# Patient Record
Sex: MALE | Race: BLACK OR AFRICAN AMERICAN | Employment: FULL TIME | URBAN - METROPOLITAN AREA
[De-identification: names, ages, dates, MRNs, and addresses within clinical notes are randomized per-mention and may not be internally consistent; named-entity substitution may affect disease eponyms.]

---

## 2017-03-17 ENCOUNTER — TELEPHONE (OUTPATIENT)
Dept: ONCOLOGY | Age: 27
End: 2017-03-17
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
## 2019-04-25 ENCOUNTER — APPOINTMENT (OUTPATIENT)
Dept: VASCULAR SURGERY | Age: 29
End: 2019-04-25
Attending: EMERGENCY MEDICINE
Payer: SELF-PAY

## 2019-04-25 ENCOUNTER — HOSPITAL ENCOUNTER (EMERGENCY)
Age: 29
Discharge: HOME | End: 2019-04-25
Attending: EMERGENCY MEDICINE
Payer: SELF-PAY

## 2019-04-25 VITALS
SYSTOLIC BLOOD PRESSURE: 128 MMHG | DIASTOLIC BLOOD PRESSURE: 83 MMHG | OXYGEN SATURATION: 97 % | WEIGHT: 232 LBS | BODY MASS INDEX: 35.16 KG/M2 | RESPIRATION RATE: 16 BRPM | HEART RATE: 87 BPM | TEMPERATURE: 98.1 F | HEIGHT: 68 IN

## 2019-04-25 DIAGNOSIS — M79.89 LEG SWELLING: Primary | ICD-10-CM

## 2019-04-25 PROCEDURE — 93971 EXTREMITY STUDY: CPT

## 2019-04-25 PROCEDURE — 99282 EMERGENCY DEPT VISIT SF MDM: CPT

## 2019-04-25 NOTE — LETTER
[facility] EMERGENCY DEPT 
[facility] [id number] 
[phone] Work/School Note Date: 4/25/2019 To Whom It May concern: 
 
Tiffanie Swift was seen and treated today in the emergency room by the following provider(s): 
Attending Provider: Tor Dubin, DO. Tiffanie Swift may return to work on 4/27/19. Sincerely, Alfredo Donald DO

## 2019-04-25 NOTE — ED NOTES
.I have reviewed discharge instructions with the patient. The patient verbalized understanding.   Patient armband removed and shredded  Pt VSS, NAD, pain reassessed

## 2019-04-25 NOTE — ED PROVIDER NOTES
EMERGENCY DEPARTMENT HISTORY AND PHYSICAL EXAM    10:21 AM      Date: 4/25/2019  Patient Name: Matt Alberts    History of Presenting Illness     Chief Complaint   Patient presents with    Leg Swelling    Leg Pain         History Provided By: Patient    Chief Complaint: Leg swelling  Duration:  Days  Timing:  Acute  Location: Left lower leg and ankle  Quality: NA  Severity: Moderate  Modifying Factors: none  Associated Symptoms: denies any other associated signs or symptoms      Additional History (Context): Matt Alberts is a 33 y.o. male with No significant past medical history who presents with left lower leg swelling x3 days. Denies any fall or injury. He does report improved somewhat last night after elevating. He states he went to the [facility] yesterday however it was a prolonged wait so he left prior to any evaluation. No prior history of DVT or PE. No recent travel, or recent surgery. Denies any pain. No shortness of breath or chest pain. No other complaints. .    PCP: Nichole Alan MD        Past History     Past Medical History:  Past Medical History:   Diagnosis Date    ADHD (attention deficit hyperactivity disorder)     ADHD (attention deficit hyperactivity disorder)     Headache     Hypertension     Insomnia     Leukemia ([address]     Leukopenia     Leukopenia     Pancreatitis        Past Surgical History:  History reviewed. No pertinent surgical history.     Family History:  Family History   Problem Relation Age of Onset    Diabetes Mother     Stroke Mother     Heart Disease Father     Hypertension Father     Diabetes Maternal Grandmother     Hypertension Maternal Grandmother     Cancer Maternal Grandmother     Hypertension Maternal Grandfather     Hypertension Paternal Grandmother     Stroke Paternal Grandmother     Diabetes Paternal Grandfather     Hypertension Paternal Grandfather     Heart Disease Paternal Grandfather        Social History:  Social History     Tobacco Use    Smoking status: Never Smoker    Smokeless tobacco: Never Used   Substance Use Topics    Alcohol use: Yes     Alcohol/week: 9.0 oz     Types: 15 Shots of liquor per week    Drug use: No       Allergies:  No Known Allergies      Review of Systems     Review of Systems   Respiratory: Negative for shortness of breath. Cardiovascular: Positive for leg swelling. Negative for chest pain. Neurological: Negative for numbness. All other systems reviewed and are negative. Physical Exam     Visit Vitals  /83 (BP 1 Location: Right arm, BP Patient Position: At rest)   Pulse 87   Temp 98.1 °F (36.7 °C)   Resp 16   Ht 5' 8\" (1.727 m)   Wt 105.2 kg (232 lb)   SpO2 97%   BMI 35.28 kg/m²       Physical Exam   Constitutional: He is oriented to person, place, and time. He appears well-developed and well-nourished. HENT:   Head: Normocephalic and atraumatic. Neck: Neck supple. No JVD present. Cardiovascular: Normal rate and regular rhythm. Pulmonary/Chest: Effort normal and breath sounds normal. No respiratory distress. He has no wheezes. Musculoskeletal: He exhibits edema (Left lower extremity 2+). Full range of motion intact left knee and ankle   Neurological: He is alert and oriented to person, place, and time. Gross sensation intact bilateral lower, DP 2+ bilateral lower   Skin: Skin is warm and dry. No erythema. Psychiatric: Judgment normal.         Diagnostic Study Results     Labs -  No results found for this or any previous visit (from the past 12 hour(s)). Radiologic Studies -   No orders to display     Duplex neg per tech    Medical Decision Making   I am the first provider for this patient. I reviewed the vital signs, available nursing notes, past medical history, past surgical history, family history and social history. Vital Signs-Reviewed the patient's vital signs.     Pulse Oximetry Analysis -  97 on room air (Interpretation)nl       Records Reviewed: Nursing Notes (Time of Review: 10:21 AM)    ED Course: Progress Notes, Reevaluation, and Consults:      Provider Notes (Medical Decision Making): 80-year-old male presenting with left lower extremity swelling x3 days. He has denies any injury, will duplex to evaluate for DVT. 11:14 AM  I discussed results with patient, his duplex is negative. He has no signs of CHF, do not feel labs are indicated at this time. Advised to use compression stockings. Of note he does work 2 full-time jobs and is on his feet a lot which may be contributing to the symptoms. Discussed return precautions. Diagnosis     Clinical Impression:   1. Leg swelling        Disposition: discharged    Follow-up Information     Follow up With Specialties Details Why Contact Info    Jeremy Mejia MD Family Practice Schedule an appointment as soon as possible for a visit in 3 days  [address] [address]  [phone]      [facility] EMERGENCY DEPT Emergency Medicine  As needed, If symptoms worsen [address]  [phone]           Patient's Medications   Start Taking    No medications on file   Continue Taking    No medications on file   These Medications have changed    No medications on file   Stop Taking    DICYCLOMINE (BENTYL) 20 MG TABLET    Take 1 Tab by mouth every six (6) hours.  Prn abdominal pain    ONDANSETRON (ZOFRAN ODT) 4 MG DISINTEGRATING TABLET    Take 1 Tab by mouth every eight (8) hours as needed for Nausea.     _______________________________

## 2019-09-04 ENCOUNTER — HOSPITAL ENCOUNTER (EMERGENCY)
Age: 29
Discharge: HOME | End: 2019-09-04
Attending: EMERGENCY MEDICINE
Payer: SELF-PAY

## 2019-09-04 ENCOUNTER — APPOINTMENT (OUTPATIENT)
Dept: GENERAL RADIOLOGY | Age: 29
End: 2019-09-04
Attending: EMERGENCY MEDICINE
Payer: SELF-PAY

## 2019-09-04 VITALS
DIASTOLIC BLOOD PRESSURE: 94 MMHG | TEMPERATURE: 98 F | SYSTOLIC BLOOD PRESSURE: 139 MMHG | BODY MASS INDEX: 37.4 KG/M2 | HEART RATE: 89 BPM | OXYGEN SATURATION: 97 % | WEIGHT: 246 LBS | RESPIRATION RATE: 16 BRPM
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
## 2019-09-04 DIAGNOSIS — S29.012A UPPER BACK STRAIN, INITIAL ENCOUNTER: ICD-10-CM

## 2019-09-04 DIAGNOSIS — Z20.2 POSSIBLE EXPOSURE TO STD: ICD-10-CM

## 2019-09-04 DIAGNOSIS — M25.531 ACUTE PAIN OF RIGHT WRIST: ICD-10-CM

## 2019-09-04 PROCEDURE — 87491 CHLMYD TRACH DNA AMP PROBE: CPT

## 2019-09-04 PROCEDURE — 73110 X-RAY EXAM OF WRIST: CPT

## 2019-09-04 PROCEDURE — 96374 THER/PROPH/DIAG INJ IV PUSH: CPT

## 2019-09-04 PROCEDURE — 99284 EMERGENCY DEPT VISIT MOD MDM: CPT

## 2019-09-04 RX ORDER — IBUPROFEN 800 MG/1
800 TABLET ORAL EVERY 8 HOURS
Qty: 15 TAB | Refills: 0 | Status: SHIPPED | OUTPATIENT
Start: 2019-09-04 | End: 2019-09-09

## 2019-09-04 RX ORDER — METRONIDAZOLE 500 MG/1
2000 TABLET ORAL
Status: DISCONTINUED | OUTPATIENT
Start: 2019-09-04 | End: 2019-09-04 | Stop reason: HOSPADM

## 2019-09-04 RX ORDER — AZITHROMYCIN 250 MG/1
1000 TABLET, FILM COATED ORAL
Status: DISCONTINUED | OUTPATIENT
Start: 2019-09-04 | End: 2019-09-04 | Stop reason: HOSPADM

## 2019-09-04 RX ORDER — METAXALONE 800 MG/1
800 TABLET ORAL 4 TIMES DAILY
Qty: 20 TAB | Refills: 0 | Status: SHIPPED | OUTPATIENT
Start: 2019-09-04 | End: 2019-09-09
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
## 2019-09-04 NOTE — LETTER
[address] [place] [address] [facility] EMERGENCY DEPT 
[address] [facility] [id number] [phone] Work/School Note Date: 9/4/2019 To Whom It May concern: 
 
Tammy Hitchcock was seen and treated today in the emergency room by the following provider(s): 
Attending Provider: France Perdomo MD 
Physician Assistant: KAM Molina. Tammy Hitchcock may return to work on 09/06/2019.  
 
Sincerely, 
 
 
 
 
Moreno Felipe RN
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
## 2019-09-06 LAB
C TRACH RRNA SPEC QL NAA+PROBE: NEGATIVE
N GONORRHOEA RRNA SPEC QL NAA+PROBE: NEGATIVE
SPECIMEN SOURCE: NORMAL
T VAGINALIS RRNA VAG QL NAA+PROBE: NEGATIVE

## 2019-12-13 PROBLEM — M54.2 CERVICAL PAIN: Status: ACTIVE | Noted: 2019-12-13

## 2019-12-13 PROBLEM — R47.81 SLURRED SPEECH: Status: ACTIVE | Noted: 2019-12-13
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
## 2020-10-26 ENCOUNTER — HOSPITAL ENCOUNTER (OUTPATIENT)
Dept: INFUSION THERAPY | Age: 30
Discharge: HOME | End: 2020-10-26
Payer: MEDICAID

## 2020-10-26 ENCOUNTER — OFFICE VISIT (OUTPATIENT)
Age: 30
End: 2020-10-26
Payer: MEDICAID

## 2020-10-26 VITALS
HEART RATE: 83 BPM | BODY MASS INDEX: 39.47 KG/M2 | SYSTOLIC BLOOD PRESSURE: 149 MMHG | OXYGEN SATURATION: 94 % | DIASTOLIC BLOOD PRESSURE: 88 MMHG | WEIGHT: 259.6 LBS | RESPIRATION RATE: 18 BRPM

## 2020-10-26 VITALS
SYSTOLIC BLOOD PRESSURE: 149 MMHG | TEMPERATURE: 99 F | RESPIRATION RATE: 18 BRPM | DIASTOLIC BLOOD PRESSURE: 88 MMHG | OXYGEN SATURATION: 94 % | HEART RATE: 83 BPM

## 2020-10-26 DIAGNOSIS — D70.9 NEUTROPENIA, UNSPECIFIED TYPE (HCC): ICD-10-CM

## 2020-10-26 DIAGNOSIS — E66.01 SEVERE OBESITY (HCC): ICD-10-CM

## 2020-10-26 DIAGNOSIS — D70.9 NEUTROPENIA, UNSPECIFIED TYPE (HCC): Primary | ICD-10-CM

## 2020-10-26 LAB
ALBUMIN SERPL-MCNC: 3.8 G/DL (ref 3.4–5)
ALBUMIN/GLOB SERPL: 0.9 {RATIO} (ref 0.8–1.7)
ALP SERPL-CCNC: 61 U/L (ref 45–117)
ALT SERPL-CCNC: 88 U/L (ref 16–61)
ANION GAP SERPL CALC-SCNC: 2 MMOL/L (ref 3–18)
AST SERPL-CCNC: 52 U/L (ref 10–38)
BASO+EOS+MONOS # BLD AUTO: 0.7 K/UL (ref 0–2.3)
BASO+EOS+MONOS NFR BLD AUTO: 19 % (ref 0.1–17)
BILIRUB SERPL-MCNC: 0.2 MG/DL (ref 0.2–1)
BUN SERPL-MCNC: 16 MG/DL (ref 7–18)
BUN/CREAT SERPL: 18 (ref 12–20)
CALCIUM SERPL-MCNC: 9.3 MG/DL (ref 8.5–10.1)
CHLORIDE SERPL-SCNC: 107 MMOL/L (ref 100–111)
CO2 SERPL-SCNC: 28 MMOL/L (ref 21–32)
CREAT SERPL-MCNC: 0.87 MG/DL (ref 0.6–1.3)
DIFFERENTIAL METHOD BLD: ABNORMAL
ERYTHROCYTE [DISTWIDTH] IN BLOOD BY AUTOMATED COUNT: 11.4 % (ref 11.5–14.5)
GLOBULIN SER CALC-MCNC: 4.3 G/DL (ref 2–4)
GLUCOSE SERPL-MCNC: 112 MG/DL (ref 74–99)
HCT VFR BLD AUTO: 45.2 % (ref 36–48)
HGB BLD-MCNC: 15.7 G/DL (ref 12–16)
LYMPHOCYTES # BLD: 1.9 K/UL (ref 1.1–5.9)
LYMPHOCYTES NFR BLD: 52 % (ref 14–44)
MCH RBC QN AUTO: 32.3 PG (ref 25–35)
MCHC RBC AUTO-ENTMCNC: 34.7 G/DL (ref 31–37)
MCV RBC AUTO: 93 FL (ref 78–102)
NEUTS SEG # BLD: 1 K/UL (ref 1.8–9.5)
NEUTS SEG NFR BLD: 30 % (ref 40–70)
PLATELET # BLD AUTO: 243 K/UL (ref 140–440)
POTASSIUM SERPL-SCNC: 4.3 MMOL/L (ref 3.5–5.5)
PROT SERPL-MCNC: 8.1 G/DL (ref 6.4–8.2)
RBC # BLD AUTO: 4.86 M/UL (ref 4.1–5.1)
SODIUM SERPL-SCNC: 137 MMOL/L (ref 136–145)
WBC # BLD AUTO: 3.6 K/UL (ref 4.5–13)

## 2020-10-26 PROCEDURE — 85025 COMPLETE CBC W/AUTO DIFF WBC: CPT

## 2020-10-26 PROCEDURE — 36415 COLL VENOUS BLD VENIPUNCTURE: CPT

## 2020-10-26 PROCEDURE — 86038 ANTINUCLEAR ANTIBODIES: CPT

## 2020-10-26 PROCEDURE — 87389 HIV-1 AG W/HIV-1&-2 AB AG IA: CPT

## 2020-10-26 PROCEDURE — 80053 COMPREHEN METABOLIC PANEL: CPT

## 2020-10-26 PROCEDURE — 99204 OFFICE O/P NEW MOD 45 MIN: CPT | Performed by: INTERNAL MEDICINE

## 2020-10-26 RX ORDER — ZOLPIDEM TARTRATE 10 MG/1
10 TABLET ORAL
COMMUNITY

## 2020-10-26 NOTE — PROGRESS NOTES
[facility] Progress Note    Date: 2020    Name: Suzanne Benavides    MRN: [id number]         : 1990    Peripheral Lab Draw      Mr. Elayne Mathis to [facility], ambulatory at 1043 accompanied by self. Pt was assessed and education was provided. Mr. Lety Drummond vitals were reviewed and patient was observed for 5 minutes prior to treatment. Visit Vitals  BP (!) 149/88   Pulse 83   Temp 99 °F (37.2 °C) (Oral)   Resp 18   SpO2 94%     Recent Results (from the past 12 hour(s))   CBC WITH 3 PART DIFF    Collection Time: 10/26/20 10:51 AM   Result Value Ref Range    WBC 3.6 (L) 4.5 - 13.0 K/uL    RBC 4.86 4.10 - 5.10 M/uL    HGB 15.7 12.0 - 16.0 g/dL    HCT 45.2 36 - 48 %    MCV 93.0 78 - 102 FL    MCH 32.3 25.0 - 35.0 PG    MCHC 34.7 31 - 37 g/dL    RDW 11.4 (L) 11.5 - 14.5 %    PLATELET 146 619 - 949 K/uL    NEUTROPHILS 30 (L) 40 - 70 %    MIXED CELLS 19 (H) 0.1 - 17 %    LYMPHOCYTES 52 (H) 14 - 44 %    ABS. NEUTROPHILS 1.0 (L) 1.8 - 9.5 K/UL    ABS. MIXED CELLS 0.7 0.0 - 2.3 K/uL    ABS. LYMPHOCYTES 1.9 1.1 - 5.9 K/UL    DF AUTOMATED         Blood obtained peripherally from left arm x 1 attempt with butterfly needle and sent to lab for Cbc w/diff, Cmp Hiv and Antinuclear AB per written orders. No bleeding or hematoma noted at site. Gauze and coban applied. Mr. Elayne Mathis tolerated the phlebotomy, and had no complaints. Patient armband removed and shredded. Mr. Elayne Mathis was discharged from [place] [address] in stable condition at 1043.      Brendan Barreto Phlebotomist PCT  2020  11:07 AM

## 2020-10-26 NOTE — PROGRESS NOTES
Hematology/Oncology  Progress Note     Name: Willi Zacarias  Date: 10/26/20  : 1990     PCP: Albaro Chan MD      Mr. Jason Higgins is a 23 y. o.  man who is being seen for benign leukopenia. Current therapy: Active surveillance     Subjective:   Mr. Jason Higgins is a 52-year-old -American man who has benign leukopenia. Patient was previously seen by Dr. Marleny Duran in 2016 was the last time. He was diagnosed then of benign neutropenia but then lost to follow-up. Dr. Marleny Duran has retired since and patient is here to see me today. On review of system he denies any fevers, chills, repeated infections, nausea vomiting or abdominal pain. No focal neurologic deficit. No drenching night sweats. No lumps and bumps. All other point of review of system have been reviewed and were negative. ECOG performance status 0. Independent with ADLs and IADLs. Past medical history, family history, and social history: these were reviewed and remains unchanged. Past Medical History:   Diagnosis Date    ADHD (attention deficit hyperactivity disorder)     ADHD (attention deficit hyperactivity disorder)     Headache     Hypertension     Insomnia     Leukemia ([facility] [address]     Leukopenia     Leukopenia     Pancreatitis     Psychiatric disorder      No past surgical history on file.   Social History     Socioeconomic History    Marital status: SINGLE     Spouse name: Not on file    Number of children: Not on file    Years of education: Not on file    Highest education level: Not on file   Tobacco Use    Smoking status: Never Smoker    Smokeless tobacco: Never Used   Substance and Sexual Activity    Alcohol use: Yes     Comment: \"3-5 drinks per week\"    Drug use: No    Sexual activity: Yes     Family History   Problem Relation Age of Onset    Diabetes Mother     Stroke Mother     Heart Disease Father     Hypertension Father     Diabetes Maternal Grandmother     Hypertension Maternal Grandmother  Cancer Maternal Grandmother     Hypertension Maternal Grandfather     Hypertension Paternal Grandmother     Stroke Paternal Grandmother     Diabetes Paternal Grandfather     Hypertension Paternal Grandfather     Heart Disease Paternal Grandfather            No Known Allergies    Review of Systems: As per HPI      Objective:  Visit Vitals  BP (!) 149/88   Pulse 83   Resp 18   Wt 117.8 kg (259 lb 9.6 oz)   SpO2 94%   BMI 39.47 kg/m²         Physical Exam:   General appearance - alert, well appearing, and in no distress  Mental status - alert, oriented to person, place, and time  EYE-SIOBHAN, EOMI  ENT-ENT exam normal, no neck nodes or sinus tenderness  Mouth - mucous membranes moist, pharynx normal without lesions  Neck - supple, no significant adenopathy   Chest - clear to auscultation, no wheezes, rales or rhonchi, symmetric air entry   Heart - normal rate and regular rhythm   Abdomen - soft, nontender, nondistended, no masses or organomegaly  Lymph- no adenopathy palpable  Ext-no pedal edema noted  Skin-Warm and dry. Neuro -alert, oriented, normal speech, no focal findings or movement disorder noted  Breast - no masses palapated b/l        Diagnostic Imaging     No results found for this or any previous visit. Results for orders placed during the hospital encounter of 01/26/20   XR CHEST PA LAT    Narrative Indication: Cough and fever  PA and lateral chest     Compared to 1/18/2017 the heart is upper normal size. Shallow inspiration with  bibasilar atelectasis. Superimposed consolidation left lower lobe suspected. Impression IMPRESSION: Left lower lobe pneumonia. Bibasilar atelectasis. Borderline heart  size. Follow-up chest x-ray within 30 days recommended to document resolution.          Results for orders placed during the hospital encounter of 01/26/20   CTA [address]    Narrative DICOM format image data is available to nonaffiliated external healthcare  facilities or entities on a secure, media free, reciprocally searchable basis  with patient authorization for 12 months following the date of the study. Indication: Hemoptysis  Technique: 3 millimeter axial images with IV contrast obtained from lung apices  to bases. 3-D MIP CTA images obtained. Comparison: 2 view chest 1/26/2020    Findings:      Mediastinum: No aortic dissection or pulmonary embolus. No mass or adenopathy in  mediastinum, vika, or axilla. Heart size normal.  No pleural effusion. Lungs: Patchy multifocal airspace disease left lower lobe posterior basal  segment with normal lung volumes. Groundglass attenuation right middle lobe  medially. No bronchiectasis or endobronchial lesion. Bones/ chest wall: Generalized bony sclerosis without destructive bony lesion. Below the diaphragm liver diffusely low in density in its visualized portion. Impression Impression:    1. No evidence of pulmonary embolus or aortic dissection. 2. Patchy airspace disease left lower lobe suggesting pneumonia with possible  additional pneumonitis right middle lobe. 3. Generalized bony sclerosis. Question metabolic bone disease. 4. Fatty liver. Lab Results  Lab Results   Component Value Date/Time    WBC 8.3 01/26/2020 12:50 AM    HGB 15.5 01/26/2020 12:50 AM    HCT 44.3 01/26/2020 12:50 AM    PLATELET 414 02/40/4989 12:50 AM    MCV 89.9 01/26/2020 12:50 AM       Lab Results   Component Value Date/Time    Sodium 135 (L) 01/26/2020 12:50 AM    Potassium 4.0 01/26/2020 12:50 AM    Chloride 103 01/26/2020 12:50 AM    CO2 22 01/26/2020 12:50 AM    Anion gap 10 01/26/2020 12:50 AM    Glucose 125 (H) 01/26/2020 12:50 AM    BUN 15 01/26/2020 12:50 AM    Creatinine 1.1 01/26/2020 12:50 AM    BUN/Creatinine ratio 14 02/29/2016 12:12 PM    GFR est AA >60 01/26/2020 12:50 AM    GFR est non-AA >60 01/26/2020 12:50 AM    Calcium 8.4 (L) 01/26/2020 12:50 AM    Alk.  phosphatase 53 12/13/2019 05:39 AM    Protein, total 7.4 12/13/2019 05:39 AM    Albumin 3.3 (L) 12/13/2019 05:39 AM    A-G Ratio 1.6 02/29/2016 12:12 PM    ALT (SGPT) 66 12/13/2019 05:39 AM   Follow-up with PCP for health maintenance    Assessment/Plan:    ICD-10-CM ICD-9-CM    1. Severe obesity (HCC)  E66.01 278.01      Orders Placed This Encounter    zolpidem (Ambien) 10 mg tablet     Sig: Take  by mouth nightly as needed for Sleep. Chronic leukopenia (benign): Patient with benign ethnic neutropenia was previously seen by Dr. Sravan Rosa who retired. Labs in December 2019 showed WBC 4.2, H&H 15.4/44, platelet 894. Normal differential.  On 7/28/2020 WBC 3.6, H&H 15.6/44.8, platelet 564. [address] slightly decreased at 1.2. Patient has no repeated infections no drenching night sweats no lumps and bumps. Patient has been having this chronic very mild leukopenia/neutropenia at least since 2016. Check labs today including HIV serology, and NORMAN as well as CBC with differential.  Otherwise no further investigation needed. *Patient safe to undergo dental surgery or any form surgery with no increased risk of infection hematology wise. RTC 2 weeks-Virtual then [facility]  have labs drawn prior to [address], call if any problems  There are no Patient Instructions on file for this visit.        Yoel Negrete MD

## 2020-10-26 NOTE — PROGRESS NOTES
Danyel Romero is a 35 y.o. male presenting today for a new patient appointment. Patient is ambulatory with no assistive devices and denies any complaints. Provider was advised. Chief Complaint   Patient presents with    Abnormal White Blood Cell Count     Leukopenia       Visit Vitals  BP (!) 149/88   Pulse 83   Resp 18   Wt 259 lb 9.6 oz (117.8 kg)   SpO2 94%   BMI 39.47 kg/m²       Current Outpatient Medications   Medication Sig    zolpidem (Ambien) 10 mg tablet Take  by mouth nightly as needed for Sleep. No current facility-administered medications for this visit. No flowsheet data found. 3 most recent PHQ Screens 10/26/2020   Little interest or pleasure in doing things Not at all   Feeling down, depressed, irritable, or hopeless Several days   Total Score PHQ 2 1       No flowsheet data found. No flowsheet data found.

## 2020-10-28 LAB
ANA TITR SER IF: NEGATIVE {TITER}
HIV 1+2 AB+HIV1 P24 AG SERPL QL IA: NONREACTIVE
HIV12 RESULT COMMENT, HHIVC: NORMAL

## 2020-11-09 ENCOUNTER — VIRTUAL VISIT (OUTPATIENT)
Age: 30
End: 2020-11-09
Payer: MEDICAID

## 2020-11-09 DIAGNOSIS — D72.819 LEUKOPENIA, UNSPECIFIED TYPE: Primary | ICD-10-CM

## 2020-11-09 PROCEDURE — 99213 OFFICE O/P EST LOW 20 MIN: CPT | Performed by: INTERNAL MEDICINE

## 2020-11-09 NOTE — PROGRESS NOTES
Yung Gannon is a 35 y.o. male presenting today for a follow-up appointment via Telehealth. Identified patient using two identifiers (full name and ). Patient denies any complaints. Provider was advised. Chief Complaint   Patient presents with    Abnormal White Blood Cell Count     Neutropenia       No flowsheet data found. Current Outpatient Medications   Medication Sig    zolpidem (Ambien) 10 mg tablet Take  by mouth nightly as needed for Sleep. No current facility-administered medications for this visit. No flowsheet data found. 3 most recent PHQ Screens 10/26/2020   Little interest or pleasure in doing things Not at all   Feeling down, depressed, irritable, or hopeless Several days   Total Score PHQ 2 1       No flowsheet data found. 1. Have you been to the ER, urgent care clinic since your last visit? Hospitalized since your last visit? No    2. Have you seen or consulted any other health care providers outside of the [address] [place] [address] since your last visit? Include any pap smears or colon screening.  No
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
## 2021-07-11 ENCOUNTER — HOSPITAL ENCOUNTER (EMERGENCY)
Age: 31
Discharge: HOME | End: 2021-07-11
Attending: STUDENT IN AN ORGANIZED HEALTH CARE EDUCATION/TRAINING PROGRAM
Payer: MEDICAID

## 2021-07-11 VITALS
WEIGHT: 242 LBS | HEIGHT: 68 IN | HEART RATE: 79 BPM | TEMPERATURE: 98.2 F | OXYGEN SATURATION: 100 % | DIASTOLIC BLOOD PRESSURE: 100 MMHG | SYSTOLIC BLOOD PRESSURE: 164 MMHG | RESPIRATION RATE: 16 BRPM | BODY MASS INDEX: 36.68 KG/M2

## 2021-07-11 DIAGNOSIS — M54.41 ACUTE BILATERAL LOW BACK PAIN WITH BILATERAL SCIATICA: Primary | ICD-10-CM

## 2021-07-11 DIAGNOSIS — M54.42 ACUTE BILATERAL LOW BACK PAIN WITH BILATERAL SCIATICA: Primary | ICD-10-CM

## 2021-07-11 PROCEDURE — 99282 EMERGENCY DEPT VISIT SF MDM: CPT

## 2021-07-11 PROCEDURE — 99281 EMR DPT VST MAYX REQ PHY/QHP: CPT

## 2021-07-11 RX ORDER — HYDROCODONE BITARTRATE AND ACETAMINOPHEN 5; 325 MG/1; MG/1
1 TABLET ORAL
Qty: 6 TABLET | Refills: 0 | Status: SHIPPED | OUTPATIENT
Start: 2021-07-11 | End: 2021-07-16

## 2021-07-11 RX ORDER — PREDNISONE 10 MG/1
TABLET ORAL
Qty: 48 TABLET | Refills: 0 | Status: SHIPPED | OUTPATIENT
Start: 2021-07-11 | End: 2022-06-07

## 2021-07-11 RX ORDER — METHOCARBAMOL 500 MG/1
500 TABLET, FILM COATED ORAL 3 TIMES DAILY
Qty: 15 TABLET | Refills: 0 | Status: SHIPPED | OUTPATIENT
Start: 2021-07-11

## 2021-07-11 NOTE — ED PROVIDER NOTES
EMERGENCY DEPARTMENT HISTORY AND PHYSICAL EXAM      Date: 7/11/2021  Patient Name: Adan Slater    History of Presenting Illness     Chief Complaint   Patient presents with    Back Pain       History Provided By: Patient    HPI: Adan Slater, 35 y.o. male PMHx significant for ADHD, insomnia, hypertension presents ambulatory to the ED with cc of bilateral low back pain that radiates down lower legs x5 days. He reports symptoms began shortly after patient work. Patient is a CNA. Denies saddle anesthesia, loss of bowel or bladder function, weakness. Patient has been taking Tylenol and ibuprofen without relief of symptoms. Denies history of DM. Patient has not followed up with Ortho. There are no other complaints, changes, or physical findings at this time. PCP: Veronica Delgado MD    No current facility-administered medications on file prior to encounter. Current Outpatient Medications on File Prior to Encounter   Medication Sig Dispense Refill    zolpidem (Ambien) 10 mg tablet Take  by mouth nightly as needed for Sleep. Past History     Past Medical History:  Past Medical History:   Diagnosis Date    ADHD (attention deficit hyperactivity disorder)     ADHD (attention deficit hyperactivity disorder)     Headache     Hypertension     Insomnia     Leuk[facility]     Leukopenia     Leukopenia     Pancreatitis     Psychiatric disorder        Past Surgical History:  History reviewed. No pertinent surgical history.     Family History:  Family History   Problem Relation Age of Onset    Diabetes Mother     Stroke Mother     Heart Disease Father     Hypertension Father     Diabetes Maternal Grandmother     Hypertension Maternal Grandmother     Cancer Maternal Grandmother     Hypertension Maternal Grandfather     Hypertension Paternal Grandmother     Stroke Paternal Grandmother     Diabetes Paternal Grandfather     Hypertension Paternal Grandfather     Heart Disease Paternal Grandfather        Social History:  Social History     Tobacco Use    Smoking status: Never Smoker    Smokeless tobacco: Never Used   Substance Use Topics    Alcohol use: Yes     Comment: \"3-5 drinks per week\"    Drug use: No       Allergies:  No Known Allergies      Review of Systems   Review of Systems   Constitutional: Negative for chills and fever. HENT: Negative for facial swelling. Eyes: Negative for photophobia and visual disturbance. Respiratory: Negative for shortness of breath. Cardiovascular: Negative for chest pain. Gastrointestinal: Negative for abdominal pain, nausea and vomiting. Genitourinary: Negative for flank pain. Musculoskeletal: Positive for back pain. Skin: Negative for color change, pallor, rash and wound. Neurological: Negative for dizziness, weakness, light-headedness and headaches. All other systems reviewed and are negative. Physical Exam   Physical Exam  Vitals and nursing note reviewed. Constitutional:       General: He is not in acute distress. Appearance: He is well-developed. Comments: Pt in NAD   HENT:      Head: Normocephalic and atraumatic. Eyes:      Conjunctiva/sclera: Conjunctivae normal.   Cardiovascular:      Rate and Rhythm: Normal rate and regular rhythm. Heart sounds: Normal heart sounds. Pulmonary:      Effort: Pulmonary effort is normal. No respiratory distress. Breath sounds: Normal breath sounds. Abdominal:      General: Bowel sounds are normal. There is no distension. Palpations: Abdomen is soft. Musculoskeletal:         General: Normal range of motion. Lumbar back: Tenderness present. No bony tenderness. Comments: DP pulses strong and equal bilaterally  Sensation equal and intact to lower extremities bilaterally  Strength 5/5 to lower extremities bilaterally   Skin:     General: Skin is warm. Findings: No rash.    Neurological:      Mental Status: He is alert and oriented to person, place, and time. Psychiatric:         Behavior: Behavior normal.         Diagnostic Study Results     Labs -   No results found for this or any previous visit (from the past 12 hour(s)). Radiologic Studies -   No orders to display     CT Results  (Last 48 hours)    None        CXR Results  (Last 48 hours)    None          Medical Decision Making   I am the first provider for this patient. I reviewed the vital signs, available nursing notes, past medical history, past surgical history, family history and social history. Vital Signs-Reviewed the patient's vital signs. Patient Vitals for the past 12 hrs:   Temp Pulse Resp BP SpO2   07/11/21 1519 98.2 °F (36.8 °C) 79 16 (!) 164/100 100 %       Records Reviewed: Nursing Notes and Old Medical Records    Provider Notes (Medical Decision Making):   DDx: Lumbar strain, Herniated disc, Muscle spasm    36 yo M who presents with bilateral lower back pain that radiates down legs x5 days. Symptoms occurred after patient was lifting patient. On exam with no tenderness with no midline tenderness. NVI. No imaging ordered due to lack of blunt trauma or injury. Suspect possible herniated disc with sciatica. Will treat patient with steroids and symptomatic treatment. Discussed need for Ortho follow-up. At time of discharge patient nontoxic-appearing in NAD. Patient stable for prompt outpatient follow-up with PCP 1 to 2 days. Patient given strict instructions to return if symptoms worsen. ED Course:   Initial assessment performed. The patients presenting problems have been discussed, and they are in agreement with the care plan formulated and outlined with them. I have encouraged them to ask questions as they arise throughout their visit. Disposition:  4:24 PM  Discussed dx and treatment plan. Discussed importance of PCP follow up. All questions answered. Pt voiced they understood. Return if sx worsen. PLAN:  1.    Current Discharge Medication List      START taking these medications    Details   predniSONE (STERAPRED DS) 10 mg dose pack Take as instructed on pack  Qty: 48 Tablet, Refills: 0  Start date: 7/11/2021      methocarbamoL (Robaxin) 500 mg tablet Take 1 Tablet by mouth three (3) times daily. Qty: 15 Tablet, Refills: 0  Start date: 7/11/2021      HYDROcodone-acetaminophen (Norco) 5-325 mg per tablet Take 1 Tablet by mouth every six (6) hours as needed for Pain for up to 5 days. Max Daily Amount: 4 Tablets. Qty: 6 Tablet, Refills: 0  Start date: 7/11/2021, End date: 7/16/2021    Associated Diagnoses: Acute bilateral low back pain with bilateral sciatica           2. Follow-up Information     Follow up With Specialties Details Why Contact Info    Chad Lombardo MD Family Medicine Schedule an appointment as soon as possible for a visit in 1 day  [address] DEPT Emergency Medicine  As needed, If symptoms worsen [facility]    Evette Beckham MD Orthopedic Surgery Schedule an appointment as soon as possible for a visit in 1 day  [address] an appointment as soon as possible for a visit in 1 day  [address] [facility]  [address]  [phone]        Return to ED if worse     Diagnosis     Clinical Impression:   1. Acute bilateral low back pain with bilateral sciatica        Attestations:    KAM Worthy    Please note that this dictation was completed with GMEX, the computer voice recognition software. Quite often unanticipated grammatical, syntax, homophones, and other interpretive errors are inadvertently transcribed by the computer software. Please disregard these errors. Please excuse any errors that have escaped final proofreading. Thank you.

## 2021-07-11 NOTE — Clinical Note
[address] [place] [address] Dr [facility] EMERGENCY DEPT  [address] [address] [phone]    Work/School Note    Date: 7/11/2021    To Whom It May concern:    Brittany Forrest was seen and treated today in the emergency room by the following provider(s):  Attending Provider: Keyla Arciniega DO  Physician Assistant: KAM Walker. Brittany Forrest is excused from work/school on 7/11/2021 through 7/14/2021. He is medically clear to return to work/school on 7/15/2021.         Sincerely,          KAM Umanzor

## 2021-11-09 DIAGNOSIS — D72.819 LEUKOPENIA, UNSPECIFIED TYPE: ICD-10-CM

## 2021-11-10 ENCOUNTER — TELEPHONE (OUTPATIENT)
Age: 31
End: 2021-11-10

## 2021-11-10 ENCOUNTER — HOSPITAL ENCOUNTER (OUTPATIENT)
Dept: INFUSION THERAPY | Age: 31
Discharge: HOME | End: 2021-11-10
Payer: MEDICAID

## 2021-11-10 LAB
ALBUMIN SERPL-MCNC: 4.1 G/DL (ref 3.4–5)
ALBUMIN/GLOB SERPL: 0.9 {RATIO} (ref 0.8–1.7)
ALP SERPL-CCNC: 67 U/L (ref 45–117)
ALT SERPL-CCNC: 64 U/L (ref 16–61)
ANION GAP SERPL CALC-SCNC: 8 MMOL/L (ref 3–18)
AST SERPL-CCNC: 33 U/L (ref 10–38)
BASO+EOS+MONOS # BLD AUTO: 0.4 K/UL (ref 0–2.3)
BASO+EOS+MONOS NFR BLD AUTO: 11 % (ref 0.1–17)
BILIRUB SERPL-MCNC: 0.6 MG/DL (ref 0.2–1)
BUN SERPL-MCNC: 17 MG/DL (ref 7–18)
BUN/CREAT SERPL: 17 (ref 12–20)
CALCIUM SERPL-MCNC: 9.8 MG/DL (ref 8.5–10.1)
CHLORIDE SERPL-SCNC: 104 MMOL/L (ref 100–111)
CO2 SERPL-SCNC: 25 MMOL/L (ref 21–32)
CREAT SERPL-MCNC: 0.98 MG/DL (ref 0.6–1.3)
DIFFERENTIAL METHOD BLD: ABNORMAL
ERYTHROCYTE [DISTWIDTH] IN BLOOD BY AUTOMATED COUNT: 12.2 % (ref 11.5–14.5)
FERRITIN SERPL-MCNC: 233 NG/ML (ref 8–388)
FOLATE SERPL-MCNC: >20 NG/ML (ref 3.1–17.5)
GLOBULIN SER CALC-MCNC: 4.5 G/DL (ref 2–4)
GLUCOSE SERPL-MCNC: 100 MG/DL (ref 74–99)
HCT VFR BLD AUTO: 48.5 % (ref 36–48)
HGB BLD-MCNC: 16.4 G/DL (ref 12–16)
IRON SATN MFR SERPL: 45 % (ref 20–50)
IRON SERPL-MCNC: 154 UG/DL (ref 50–175)
LYMPHOCYTES # BLD: 1.9 K/UL (ref 1.1–5.9)
LYMPHOCYTES NFR BLD: 52 % (ref 14–44)
MCH RBC QN AUTO: 31.4 PG (ref 25–35)
MCHC RBC AUTO-ENTMCNC: 33.8 G/DL (ref 31–37)
MCV RBC AUTO: 92.7 FL (ref 78–102)
NEUTS SEG # BLD: 1.3 K/UL (ref 1.8–9.5)
NEUTS SEG NFR BLD: 37 % (ref 40–70)
PERIPHERAL SMEAR,PSM: NORMAL
PLATELET # BLD AUTO: 272 K/UL (ref 140–440)
POTASSIUM SERPL-SCNC: 4 MMOL/L (ref 3.5–5.5)
PROT SERPL-MCNC: 8.6 G/DL (ref 6.4–8.2)
RBC # BLD AUTO: 5.23 M/UL (ref 4.1–5.1)
SODIUM SERPL-SCNC: 137 MMOL/L (ref 136–145)
TIBC SERPL-MCNC: 341 UG/DL (ref 250–450)
VIT B12 SERPL-MCNC: 726 PG/ML (ref 211–911)
WBC # BLD AUTO: 3.6 K/UL (ref 4.5–13)

## 2021-11-10 PROCEDURE — 85025 COMPLETE CBC W/AUTO DIFF WBC: CPT

## 2021-11-10 PROCEDURE — 82728 ASSAY OF FERRITIN: CPT

## 2021-11-10 PROCEDURE — 80053 COMPREHEN METABOLIC PANEL: CPT

## 2021-11-10 PROCEDURE — 83540 ASSAY OF IRON: CPT

## 2021-11-10 PROCEDURE — 36415 COLL VENOUS BLD VENIPUNCTURE: CPT

## 2021-11-10 PROCEDURE — 82607 VITAMIN B-12: CPT

## 2021-11-10 NOTE — TELEPHONE ENCOUNTER
Patient had labs done today, but wasn't schedule for an appointment, he is schedule now for Dec 2nd does he need to re do labs?

## 2021-11-10 NOTE — PROGRESS NOTES
[facility] Progress Note    Date: November 10, 2021    Name: Cintia Joseph    MRN: [id number]         : 1990    Peripheral Lab Draw    Recent Results (from the past 12 hour(s))   CBC WITH 3 PART DIFF    Collection Time: 11/10/21  1:42 PM   Result Value Ref Range    WBC 3.6 (L) 4.5 - 13.0 K/uL    RBC 5.23 (H) 4.10 - 5.10 M/uL    HGB 16.4 (HH) 12.0 - 16.0 g/dL    HCT 48.5 (H) 36 - 48 %    MCV 92.7 78 - 102 FL    MCH 31.4 25.0 - 35.0 PG    MCHC 33.8 31 - 37 g/dL    RDW 12.2 11.5 - 14.5 %    PLATELET 876 783 - 043 K/uL    NEUTROPHILS 37 (L) 40 - 70 %    MIXED CELLS 11 0.1 - 17 %    LYMPHOCYTES 52 (H) 14 - 44 %    ABS. NEUTROPHILS 1.3 (L) 1.8 - 9.5 K/UL    ABS. MIXED CELLS 0.4 0.0 - 2.3 K/uL    ABS. LYMPHOCYTES 1.9 1.1 - 5.9 K/UL    DF AUTOMATED         Mr. Jovani Dye to [facility], ambulatory at 1332 accompanied by self. Pt was assessed and education was provided. Blood obtained peripherally from left arm x 1 attempt with butterfly needle and sent to lab for Cbc w/diff, Cmp, Iron Profile, Ferritin, Peripheral smear, Vitamin B12 & Folate per written orders. No bleeding or hematoma noted at site. Gauze and coban applied. Mr. Jovani Dye tolerated the phlebotomy, and had no complaints. Patient armband removed and shredded. Mr. Jovani Dye was discharged from [place] [address] in stable condition at 1342.      Elli Mcgovern Phlebotomist PCT  November 10, 2021  2:23 PM

## 2021-11-11 NOTE — PROGRESS NOTES
[facility] Progress Note    1500  Pt arrived in stable condition for Injectafer 1/2    Assessment completed, patient reports fatigue and shortness of breath upon exertion. Patient denied having any symptoms of COVID-19, i.e. SOB, coughing, fever, or generally not feeling well. Also denies having been exposed to COVID-19 recently or having had any recent contact with family/friend that has a pending COVID test.     24G PIV established in left arm without issue and positive blood return noted. Labs already drawn 10/27/2021    Patient Vitals for the past 12 hrs:   Temp Pulse Resp BP   11/02/21 1637  (P) 76  (P) 132/77   11/02/21 1507 97.8 °F (36.6 °C) 84 18 128/71          The following medications administered:  Medications Administered     ferric carboxymaltose (INJECTAFER) 750 mg in 0.9% sodium chloride 250 mL, overfill volume 25 mL IVPB     Admin Date  11/02/2021 Action  New Bag Dose  750 mg Rate  870 mL/hr Route  IntraVENous Administered By  Wood Murphy RN          sodium chloride (NS) flush 10 mL     Admin Date  11/02/2021 Action  Given Dose  10 mL Route  IntraVENous Administered By  Wood Murphy RN                Pt tolerated treatment well. IV flushed per policy and removed, 2x2 and coban placed. Patient remained in [facility] for 30mins post infusion. Pt provided with education on possible side effects of medication along with discharge instructions. Pt verbalized understanding. 1700  Pt discharged in no acute distress.  Next appointment:    Future Appointments   Date Time Provider Gbariel Kelley   11/9/2021  3:00 PM JONA CRISTINA 4 [facility]   12/27/2021  8:30 AM Jd Stpaleton NP ONC BS AMB Patient has appointment on 12/02/21.

## 2021-12-02 ENCOUNTER — OFFICE VISIT (OUTPATIENT)
Age: 31
End: 2021-12-02
Payer: MEDICAID

## 2021-12-02 VITALS
HEART RATE: 87 BPM | BODY MASS INDEX: 37.44 KG/M2 | TEMPERATURE: 98.4 F | RESPIRATION RATE: 17 BRPM | SYSTOLIC BLOOD PRESSURE: 124 MMHG | DIASTOLIC BLOOD PRESSURE: 83 MMHG | HEIGHT: 68 IN | WEIGHT: 247 LBS | OXYGEN SATURATION: 94 %

## 2021-12-02 DIAGNOSIS — D72.819 LEUKOPENIA, UNSPECIFIED TYPE: Primary | ICD-10-CM

## 2021-12-02 PROCEDURE — 99213 OFFICE O/P EST LOW 20 MIN: CPT | Performed by: INTERNAL MEDICINE

## 2021-12-02 RX ORDER — IBUPROFEN 600 MG/1
600 TABLET ORAL
COMMUNITY
Start: 2021-05-20

## 2021-12-02 RX ORDER — ACETAMINOPHEN 325 MG/1
650 TABLET ORAL
COMMUNITY
Start: 2021-05-20

## 2021-12-02 NOTE — PROGRESS NOTES
Hematology/Oncology Progress Note    Name: Lucero Chandler  : 1990    Mr. Charles Jerez is a 28y.o. year old male who was seen for   Chief Complaint   Patient presents with    Abnormal White Blood Cell Count        DX:  1. Leukopenia, unspecified type  Benign ethnic neutropenia.       Subjective:     Patient denies generalized malaise ,  Weakness, shortness of breath, chest pain, nausea,  vomiting,  diarrhea, abdominal pain,  melena, fever , night sweats , or weight loss. Current Outpatient Medications   Medication Sig Dispense Refill    acetaminophen (TYLENOL) 325 mg tablet Take 650 mg by mouth every six (6) hours as needed.  ibuprofen (MOTRIN) 600 mg tablet Take 600 mg by mouth every six (6) hours as needed.  zolpidem (Ambien) 10 mg tablet Take 10 mg by mouth nightly as needed for Sleep.  predniSONE (STERAPRED DS) 10 mg dose pack Take as instructed on pack 48 Tablet 0    methocarbamoL (Robaxin) 500 mg tablet Take 1 Tablet by mouth three (3) times daily. 15 Tablet 0       Past Medical History:   Diagnosis Date    ADHD (attention deficit hyperactivity disorder)     ADHD (attention deficit hyperactivity disorder)     Headache     Hypertension     Insomnia     Leukemia ([facility] [address]     Leukopenia     Leukopenia     Pancreatitis     Psychiatric disorder      History reviewed. No pertinent surgical history.   Social History     Socioeconomic History    Marital status: SINGLE     Spouse name: Not on file    Number of children: Not on file    Years of education: Not on file    Highest education level: Not on file   Occupational History    Not on file   Tobacco Use    Smoking status: Never Smoker    Smokeless tobacco: Never Used   Substance and Sexual Activity    Alcohol use: Yes     Comment: \"3-5 drinks per week\"    Drug use: No    Sexual activity: Yes   Other Topics Concern    Not on file   Social History Narrative    Not on file     Social Determinants of Health     Financial Resource Strain:     Difficulty of Paying Living Expenses: Not on file   Food Insecurity:     Worried About Running Out of Food in the Last Year: Not on file    [place] of Food in the Last Year: Not on file   Transportation Needs:     Lack of Transportation (Medical): Not on file    Lack of Transportation (Non-Medical): Not on file   Physical Activity:     Days of Exercise per Week: Not on file    Minutes of Exercise per Session: Not on file   Stress:     Feeling of Stress : Not on file   Social Connections:     Frequency of Communication with Friends and Family: Not on file    Frequency of Social Gatherings with Friends and Family: Not on file    Attends Bahai Services: Not on file    Active Member of [address] [place] [address] [employer] or Organizations: Not on file    Attends Club or Organization Meetings: Not on file    Marital Status: Not on file   Intimate Partner Violence:     Fear of Current or Ex-Partner: Not on file    Emotionally Abused: Not on file    Physically Abused: Not on file    Sexually Abused: Not on file   Housing Stability:     Unable to Pay for Housing in the Last Year: Not on file    Number of [place] in the Last Year: Not on file    Unstable Housing in the Last Year: Not on file     Family History   Problem Relation Age of Onset    Diabetes Mother     Stroke Mother     Heart Disease Father     Hypertension Father     Diabetes Maternal Grandmother     Hypertension Maternal Grandmother     Cancer Maternal Grandmother     Hypertension Maternal Grandfather     Hypertension Paternal Grandmother     Stroke Paternal Grandmother     Diabetes Paternal Grandfather     Hypertension Paternal Grandfather     Heart Disease Paternal Grandfather        Review of Systems  Constitutional: negative for fevers, chills, sweats, fatigue, malaise, anorexia and weight loss  Eyes: negative for visual disturbance, redness, eye pain and discharge.   Ears, nose, mouth, throat, and face: negative for hearing loss, ear drainage, nasal congestion, sore throat, sinus pressure, pain in and around ear. Respiratory: negative for cough, sputum, hemoptysis, pleurisy/chest pain, wheezing or dyspnea on exertion  Cardiovascular: negative for chest pain, dyspnea, palpitations, irregular heart beats, syncope, dizziness  Gastrointestinal: negative for dysphagia, dyspepsia, nausea, vomiting, change in bowel habits, melena, diarrhea, constipation, abdominal pain and jaundice  Genitourinary:negative for frequency, dysuria, hesitancy, hematuria and urinary retention. Hematologic/lymphatic: negative for easy bruising, bleeding, lymphadenopathy and petechiae  Musculoskeletal:negative for arthralgias, neck pain, back pain, muscle weakness and bone pain  Neurological: negative for headaches, vertigo, seizures, memory problems, speech problems, gait problems, tremor, weakness and gait disturbance. Endocrine: negative for diabetic symptoms including polyuria, polydipsia, pruritus and intolerance to heat and cold. Allergic/Immunologic: negative for contact allergy. Objective:       Visit Vitals  /83   Pulse 87   Temp 98.4 °F (36.9 °C)   Resp 17   Ht 5' 8\" (1.727 m)   Wt 112 kg (247 lb)   SpO2 94%   BMI 37.56 kg/m²         Physical Exam: General appearance: alert, cooperative, no distress, appears stated age  Ears: Hearing grossly normal.  Nose: Nares normal. Septum midline. Mucosa normal. No drainage or sinus tenderness. Throat: Lips, mucosa, and tongue normal. Teeth and gums normal  Lungs: clear to auscultation bilaterally  Heart: regular rate and rhythm, S1, S2 normal, no murmur, click, rub or gallop  Abdomen: soft, non-tender. Bowel sounds normal. No masses,  no organomegaly  Extremities: no edema, redness or tenderness in the calves or thighs  Skin: Skin color, texture, turgor normal. No rashes or lesions  Neurologic: Alert and oriented X 3, normal strength and tone. Normal symmetric reflexes.  Normal coordination and gait Results for orders placed or performed during the hospital encounter of 11/10/21   CBC WITH 3 PART DIFF   Result Value Ref Range    WBC 3.6 (L) 4.5 - 13.0 K/uL    RBC 5.23 (H) 4.10 - 5.10 M/uL    HGB 16.4 (HH) 12.0 - 16.0 g/dL    HCT 48.5 (H) 36 - 48 %    MCV 92.7 78 - 102 FL    MCH 31.4 25.0 - 35.0 PG    MCHC 33.8 31 - 37 g/dL    RDW 12.2 11.5 - 14.5 %    PLATELET 586 410 - 420 K/uL    NEUTROPHILS 37 (L) 40 - 70 %    MIXED CELLS 11 0.1 - 17 %    LYMPHOCYTES 52 (H) 14 - 44 %    ABS. NEUTROPHILS 1.3 (L) 1.8 - 9.5 K/UL    ABS. MIXED CELLS 0.4 0.0 - 2.3 K/uL    ABS. LYMPHOCYTES 1.9 1.1 - 5.9 K/UL    DF AUTOMATED     FERRITIN   Result Value Ref Range    Ferritin 233 8 - 388 NG/ML   IRON PROFILE   Result Value Ref Range    Iron 154 50 - 175 ug/dL    TIBC 341 250 - 450 ug/dL    Iron % saturation 45 20 - 50 %   METABOLIC PANEL, COMPREHENSIVE   Result Value Ref Range    Sodium 137 136 - 145 mmol/L    Potassium 4.0 3.5 - 5.5 mmol/L    Chloride 104 100 - 111 mmol/L    CO2 25 21 - 32 mmol/L    Anion gap 8 3.0 - 18 mmol/L    Glucose 100 (H) 74 - 99 mg/dL    BUN 17 7.0 - 18 MG/DL    Creatinine 0.98 0.6 - 1.3 MG/DL    BUN/Creatinine ratio 17 12 - 20      GFR est AA >60 >60 ml/min/1.73m2    GFR est non-AA >60 >60 ml/min/1.73m2    Calcium 9.8 8.5 - 10.1 MG/DL    Bilirubin, total 0.6 0.2 - 1.0 MG/DL    ALT (SGPT) 64 (H) 16 - 61 U/L    AST (SGOT) 33 10 - 38 U/L    Alk. phosphatase 67 45 - 117 U/L    Protein, total 8.6 (H) 6.4 - 8.2 g/dL    Albumin 4.1 3.4 - 5.0 g/dL    Globulin 4.5 (H) 2.0 - 4.0 g/dL    A-G Ratio 0.9 0.8 - 1.7     VITAMIN B12 & FOLATE   Result Value Ref Range    Vitamin B12 726 211 - 911 pg/mL    Folate >20.0 (H) 3.10 - 17.50 ng/mL   PERIPHERAL SMEAR   Result Value Ref Range    PERIPHERAL SMEAR NOT INDICATED           Assessment:     1. Leukopenia, unspecified type      - Pt is asymptomatic.  - Continue multivitamins and f/u after 6 months with repeat CBC.         Follow-up and Dispositions  ·   Return in about 6 months (around 6/2/2022). Orders Placed This Encounter    acetaminophen (TYLENOL) 325 mg tablet     Sig: Take 650 mg by mouth every six (6) hours as needed.  ibuprofen (MOTRIN) 600 mg tablet     Sig: Take 600 mg by mouth every six (6) hours as needed.        Nirmal Del Rio MD  12/2/2021

## 2022-03-13 ENCOUNTER — HOSPITAL ENCOUNTER (EMERGENCY)
Age: 32
Discharge: HOME | End: 2022-03-14
Attending: STUDENT IN AN ORGANIZED HEALTH CARE EDUCATION/TRAINING PROGRAM
Payer: MEDICAID

## 2022-03-13 ENCOUNTER — APPOINTMENT (OUTPATIENT)
Dept: CT IMAGING | Age: 32
End: 2022-03-13
Attending: STUDENT IN AN ORGANIZED HEALTH CARE EDUCATION/TRAINING PROGRAM
Payer: MEDICAID

## 2022-03-13 VITALS
SYSTOLIC BLOOD PRESSURE: 155 MMHG | OXYGEN SATURATION: 98 % | DIASTOLIC BLOOD PRESSURE: 94 MMHG | HEART RATE: 66 BPM | RESPIRATION RATE: 17 BRPM

## 2022-03-13 DIAGNOSIS — G43.109 MIGRAINE WITH AURA AND WITHOUT STATUS MIGRAINOSUS, NOT INTRACTABLE: Primary | ICD-10-CM

## 2022-03-13 LAB
ANION GAP SERPL CALC-SCNC: 2 MMOL/L (ref 3–18)
BASOPHILS # BLD: 0 K/UL (ref 0–0.1)
BASOPHILS NFR BLD: 0 % (ref 0–2)
BUN SERPL-MCNC: 20 MG/DL (ref 7–18)
BUN/CREAT SERPL: 20 (ref 12–20)
CALCIUM SERPL-MCNC: 8.4 MG/DL (ref 8.5–10.1)
CHLORIDE SERPL-SCNC: 105 MMOL/L (ref 100–111)
CO2 SERPL-SCNC: 29 MMOL/L (ref 21–32)
CREAT SERPL-MCNC: 0.98 MG/DL (ref 0.6–1.3)
DIFFERENTIAL METHOD BLD: ABNORMAL
EOSINOPHIL # BLD: 0.1 K/UL (ref 0–0.4)
EOSINOPHIL NFR BLD: 2 % (ref 0–5)
ERYTHROCYTE [DISTWIDTH] IN BLOOD BY AUTOMATED COUNT: 12.1 % (ref 11.6–14.5)
GLUCOSE BLD STRIP.AUTO-MCNC: 102 MG/DL (ref 70–110)
GLUCOSE SERPL-MCNC: 109 MG/DL (ref 74–99)
HCT VFR BLD AUTO: 46.7 % (ref 36–48)
HGB BLD-MCNC: 15.9 G/DL (ref 13–16)
IMM GRANULOCYTES # BLD AUTO: 0 K/UL (ref 0–0.04)
IMM GRANULOCYTES NFR BLD AUTO: 0 % (ref 0–0.5)
INR PPP: 1 (ref 0.8–1.2)
LYMPHOCYTES # BLD: 2.7 K/UL (ref 0.9–3.6)
LYMPHOCYTES NFR BLD: 57 % (ref 21–52)
MCH RBC QN AUTO: 31.5 PG (ref 24–34)
MCHC RBC AUTO-ENTMCNC: 34 G/DL (ref 31–37)
MCV RBC AUTO: 92.5 FL (ref 78–100)
MONOCYTES # BLD: 0.6 K/UL (ref 0.05–1.2)
MONOCYTES NFR BLD: 12 % (ref 3–10)
NEUTS SEG # BLD: 1.4 K/UL (ref 1.8–8)
NEUTS SEG NFR BLD: 30 % (ref 40–73)
NRBC # BLD: 0 K/UL (ref 0–0.01)
NRBC BLD-RTO: 0 PER 100 WBC
PLATELET # BLD AUTO: 251 K/UL (ref 135–420)
PMV BLD AUTO: 10.3 FL (ref 9.2–11.8)
POTASSIUM SERPL-SCNC: 4 MMOL/L (ref 3.5–5.5)
PROTHROMBIN TIME: 12.8 SEC (ref 11.5–15.2)
RBC # BLD AUTO: 5.05 M/UL (ref 4.35–5.65)
SODIUM SERPL-SCNC: 136 MMOL/L (ref 136–145)
WBC # BLD AUTO: 4.8 K/UL (ref 4.6–13.2)

## 2022-03-13 PROCEDURE — 70496 CT ANGIOGRAPHY HEAD: CPT

## 2022-03-13 PROCEDURE — 85610 PROTHROMBIN TIME: CPT

## 2022-03-13 PROCEDURE — 74011250636 HC RX REV CODE- 250/636: Performed by: STUDENT IN AN ORGANIZED HEALTH CARE EDUCATION/TRAINING PROGRAM

## 2022-03-13 PROCEDURE — 99285 EMERGENCY DEPT VISIT HI MDM: CPT

## 2022-03-13 PROCEDURE — 82962 GLUCOSE BLOOD TEST: CPT

## 2022-03-13 PROCEDURE — 96361 HYDRATE IV INFUSION ADD-ON: CPT

## 2022-03-13 PROCEDURE — 70450 CT HEAD/BRAIN W/O DYE: CPT

## 2022-03-13 PROCEDURE — 85025 COMPLETE CBC W/AUTO DIFF WBC: CPT

## 2022-03-13 PROCEDURE — 80048 BASIC METABOLIC PNL TOTAL CA: CPT

## 2022-03-13 PROCEDURE — 74011000636 HC RX REV CODE- 636: Performed by: STUDENT IN AN ORGANIZED HEALTH CARE EDUCATION/TRAINING PROGRAM

## 2022-03-13 RX ORDER — PROCHLORPERAZINE EDISYLATE 5 MG/ML
10 INJECTION INTRAMUSCULAR; INTRAVENOUS ONCE
Status: COMPLETED | OUTPATIENT
Start: 2022-03-13 | End: 2022-03-14

## 2022-03-13 RX ORDER — KETOROLAC TROMETHAMINE 15 MG/ML
15 INJECTION, SOLUTION INTRAMUSCULAR; INTRAVENOUS ONCE
Status: COMPLETED | OUTPATIENT
Start: 2022-03-13 | End: 2022-03-14

## 2022-03-13 RX ORDER — DIPHENHYDRAMINE HCL 25 MG
25 CAPSULE ORAL
Status: COMPLETED | OUTPATIENT
Start: 2022-03-13 | End: 2022-03-14

## 2022-03-13 RX ORDER — ACETAMINOPHEN 325 MG/1
650 TABLET ORAL ONCE
Status: COMPLETED | OUTPATIENT
Start: 2022-03-13 | End: 2022-03-14

## 2022-03-13 RX ADMIN — SODIUM CHLORIDE 1000 ML: 900 INJECTION, SOLUTION INTRAVENOUS at 23:22

## 2022-03-13 RX ADMIN — IOPAMIDOL 100 ML: 755 INJECTION, SOLUTION INTRAVENOUS at 23:06

## 2022-03-14 PROCEDURE — 74011250636 HC RX REV CODE- 250/636: Performed by: STUDENT IN AN ORGANIZED HEALTH CARE EDUCATION/TRAINING PROGRAM

## 2022-03-14 PROCEDURE — 96374 THER/PROPH/DIAG INJ IV PUSH: CPT

## 2022-03-14 PROCEDURE — 96361 HYDRATE IV INFUSION ADD-ON: CPT

## 2022-03-14 PROCEDURE — 74011250637 HC RX REV CODE- 250/637: Performed by: STUDENT IN AN ORGANIZED HEALTH CARE EDUCATION/TRAINING PROGRAM

## 2022-03-14 PROCEDURE — 96375 TX/PRO/DX INJ NEW DRUG ADDON: CPT

## 2022-03-14 RX ADMIN — ACETAMINOPHEN 650 MG: 325 TABLET ORAL at 00:13

## 2022-03-14 RX ADMIN — PROCHLORPERAZINE EDISYLATE 10 MG: 5 INJECTION INTRAMUSCULAR; INTRAVENOUS at 00:14

## 2022-03-14 RX ADMIN — KETOROLAC TROMETHAMINE 15 MG: 15 INJECTION, SOLUTION INTRAMUSCULAR; INTRAVENOUS at 00:16

## 2022-03-14 RX ADMIN — DIPHENHYDRAMINE HYDROCHLORIDE 25 MG: 25 CAPSULE ORAL at 00:12
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
## 2022-03-14 NOTE — ED TRIAGE NOTES
Patient comes in stating that he was at work on the phone around 8 pm when he began stuttering. Patient states that he does not stutter. Patient states that he then began having a right sided headache and was unable to see out of his right eye. Patient states that his speech is not back to baseline and now sounds slurred. Dr. Malcolm Mora available and called to triage to assess for code stroke. Code stroke initiated. Blood sugar in triage is 102. Patient taken to CT/CTA by this RN and brought back to room 1.

## 2022-03-14 NOTE — DISCHARGE INSTRUCTIONS
You were seen in the emergency department for concerns of a headache, vision changes, slurred speech. You were evaluated for a stroke with a head CT that showed no intracranial abnormalities. Neurology was consulted with concerns for a complex migraine. You were given medications for a migraine with improvement of your symptoms. You were discharged home. Please schedule an appointment with neurology for further evaluation and treatment of migraines. Please return to the ER if your symptoms return, if you have loss of consciousness, numbness, tingling, loss of vision, weakness.
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
## 2022-03-19 PROBLEM — M54.2 CERVICAL PAIN: Status: ACTIVE | Noted: 2019-12-13

## 2022-03-20 PROBLEM — E66.01 SEVERE OBESITY (HCC): Status: ACTIVE | Noted: 2020-10-26

## 2022-03-20 PROBLEM — R47.81 SLURRED SPEECH: Status: ACTIVE | Noted: 2019-12-13

## 2022-03-29 ENCOUNTER — OFFICE VISIT (OUTPATIENT)
Dept: NEUROLOGY | Age: 32
End: 2022-03-29
Payer: MEDICAID

## 2022-03-29 VITALS
BODY MASS INDEX: 38.32 KG/M2 | SYSTOLIC BLOOD PRESSURE: 120 MMHG | WEIGHT: 252 LBS | HEART RATE: 77 BPM | DIASTOLIC BLOOD PRESSURE: 70 MMHG | OXYGEN SATURATION: 96 % | RESPIRATION RATE: 16 BRPM

## 2022-03-29 DIAGNOSIS — G47.9 SLEEP DISORDER: ICD-10-CM

## 2022-03-29 DIAGNOSIS — M54.12 CERVICAL RADICULOPATHY: Primary | ICD-10-CM

## 2022-03-29 PROCEDURE — 99203 OFFICE O/P NEW LOW 30 MIN: CPT | Performed by: PSYCHIATRY & NEUROLOGY

## 2022-03-29 RX ORDER — AMLODIPINE BESYLATE 5 MG/1
TABLET ORAL
COMMUNITY
Start: 2022-01-18

## 2022-03-29 RX ORDER — PROPRANOLOL HYDROCHLORIDE 60 MG/1
60 CAPSULE, EXTENDED RELEASE ORAL DAILY
Qty: 60 CAPSULE | Refills: 1 | Status: SHIPPED | OUTPATIENT
Start: 2022-03-29
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
## 2022-04-05 DIAGNOSIS — M54.12 CERVICAL RADICULOPATHY: ICD-10-CM

## 2022-04-10 ENCOUNTER — HOSPITAL ENCOUNTER (OUTPATIENT)
Age: 32
Discharge: HOME | End: 2022-04-10
Attending: PSYCHIATRY & NEUROLOGY
Payer: MEDICAID

## 2022-04-10 PROCEDURE — 72141 MRI NECK SPINE W/O DYE: CPT

## 2022-06-01 ENCOUNTER — HOSPITAL ENCOUNTER (OUTPATIENT)
Dept: INFUSION THERAPY | Age: 32
Discharge: HOME | End: 2022-06-01
Payer: COMMERCIAL

## 2022-06-01 LAB
ALBUMIN SERPL-MCNC: 4.1 G/DL (ref 3.4–5)
ALBUMIN/GLOB SERPL: 1.1 {RATIO} (ref 0.8–1.7)
ALP SERPL-CCNC: 57 U/L (ref 45–117)
ALT SERPL-CCNC: 52 U/L (ref 16–61)
ANION GAP SERPL CALC-SCNC: 5 MMOL/L (ref 3–18)
AST SERPL-CCNC: 34 U/L (ref 10–38)
BASO+EOS+MONOS # BLD AUTO: 0.4 K/UL (ref 0–2.3)
BASO+EOS+MONOS NFR BLD AUTO: 12 % (ref 0.1–17)
BILIRUB SERPL-MCNC: 0.5 MG/DL (ref 0.2–1)
BUN SERPL-MCNC: 17 MG/DL (ref 7–18)
BUN/CREAT SERPL: 18 (ref 12–20)
CALCIUM SERPL-MCNC: 9.6 MG/DL (ref 8.5–10.1)
CHLORIDE SERPL-SCNC: 106 MMOL/L (ref 100–111)
CO2 SERPL-SCNC: 27 MMOL/L (ref 21–32)
CREAT SERPL-MCNC: 0.96 MG/DL (ref 0.6–1.3)
DIFFERENTIAL METHOD BLD: ABNORMAL
ERYTHROCYTE [DISTWIDTH] IN BLOOD BY AUTOMATED COUNT: 12 % (ref 11.5–14.5)
GLOBULIN SER CALC-MCNC: 3.6 G/DL (ref 2–4)
GLUCOSE SERPL-MCNC: 119 MG/DL (ref 74–99)
HCT VFR BLD AUTO: 49.4 % (ref 36–48)
HGB BLD-MCNC: 16.4 G/DL (ref 12–16)
HGB BLD-MCNC: 16.7 G/DL (ref 13–16)
LYMPHOCYTES # BLD: 1.8 K/UL (ref 1.1–5.9)
LYMPHOCYTES NFR BLD: 56 % (ref 14–44)
MCH RBC QN AUTO: 31.5 PG (ref 25–35)
MCHC RBC AUTO-ENTMCNC: 33.2 G/DL (ref 31–37)
MCV RBC AUTO: 94.8 FL (ref 78–102)
NEUTS SEG # BLD: 1 K/UL (ref 1.8–9.5)
NEUTS SEG NFR BLD: 32 % (ref 40–70)
PLATELET # BLD AUTO: 246 K/UL (ref 140–440)
POTASSIUM SERPL-SCNC: 4.3 MMOL/L (ref 3.5–5.5)
PROT SERPL-MCNC: 7.7 G/DL (ref 6.4–8.2)
RBC # BLD AUTO: 5.21 M/UL (ref 4.1–5.1)
SODIUM SERPL-SCNC: 138 MMOL/L (ref 136–145)
WBC # BLD AUTO: 3.2 K/UL (ref 4.5–13)

## 2022-06-01 PROCEDURE — 85018 HEMOGLOBIN: CPT

## 2022-06-01 PROCEDURE — 80053 COMPREHEN METABOLIC PANEL: CPT

## 2022-06-01 PROCEDURE — 85025 COMPLETE CBC W/AUTO DIFF WBC: CPT

## 2022-06-01 PROCEDURE — 36415 COLL VENOUS BLD VENIPUNCTURE: CPT

## 2022-06-01 NOTE — PROGRESS NOTES
[facility] Progress Note    Date: 2022    Name: Pascual Small    MRN: [id number]         : 1990    Peripheral Lab Draw    Recent Results (from the past 12 hour(s))   CBC WITH 3 PART DIFF    Collection Time: 22 11:01 AM   Result Value Ref Range    WBC 3.2 (L) 4.5 - 13.0 K/uL    RBC 5.21 (H) 4.10 - 5.10 M/uL    HGB 16.4 (HH) 12.0 - 16.0 g/dL    HCT 49.4 (H) 36 - 48 %    MCV 94.8 78 - 102 FL    MCH 31.5 25.0 - 35.0 PG    MCHC 33.2 31 - 37 g/dL    RDW 12.0 11.5 - 14.5 %    PLATELET 270 049 - 778 K/uL    NEUTROPHILS 32 (L) 40 - 70 %    Mixed cells 12 0.1 - 17 %    LYMPHOCYTES 56 (H) 14 - 44 %    ABS. NEUTROPHILS 1.0 (L) 1.8 - 9.5 K/UL    ABS. MIXED CELLS 0.4 0.0 - 2.3 K/uL    ABS. LYMPHOCYTES 1.8 1.1 - 5.9 K/UL    DF AUTOMATED         Mr. Thomas Rose to [address], ambulatory at 1051 accompanied by self. Pt was assessed and education was provided. Mr. Enrique Stone vitals were reviewed and patient was observed for 5 minutes prior to treatment. There were no vitals taken for this visit. Blood obtained peripherally from left arm x 1 attempt with butterfly needle and sent to lab Cbc w/diff and Cmp per written orders. No bleeding or hematoma noted at site. Gauze and coban applied. Mr. Thomas Rose tolerated the phlebotomy, and had no complaints. Patient armband removed and shredded. Mr. Thomas Rose was discharged from [place] [address] in stable condition at 1101.      Lamar Nichole Phlebotomist PCT  2022  11:47 AM

## 2022-06-02 DIAGNOSIS — D72.819 LEUKOPENIA, UNSPECIFIED TYPE: ICD-10-CM

## 2022-06-09 ENCOUNTER — VIRTUAL VISIT (OUTPATIENT)
Age: 32
End: 2022-06-09
Payer: MEDICAID

## 2022-06-09 DIAGNOSIS — D70.8 BENIGN ETHNIC NEUTROPENIA (HCC): Primary | ICD-10-CM

## 2022-06-09 PROCEDURE — 99443 PR PHYS/QHP TELEPHONE EVALUATION 21-30 MIN: CPT | Performed by: INTERNAL MEDICINE

## 2022-06-09 NOTE — PROGRESS NOTES
Kiran Self is a [address] [place] y.o. male, evaluated via audio-only technology on 6/9/2022 for Abnormal White Blood Cell Count    Mr. Marlo Rosado is a 8[address] year old male with [place] American man who is being seen for benign leukopenia. Blood counts are good and the [address] [place] [address] Way is remained stable since 2016    Today 2 days ago patient was in emergency room for back strain or sprain. He will follow-up with his back physicians. Current therapy: Active surveillance. Assessment & Plan:   Diagnoses and all orders for this visit:    1. Benign ethnic neutropenia ([address]    Patient will follow up with us in 1 year with blood test by telephone. In the interval he will follow-up with his primary care provider and we will be glad to see him at any time. .   From the [address] [place] [address] of 1.0 K point of view there is no contraindications for surgical procedure  Patient had opportunity to ask questions which were answered. Patient is in agreement with this plan  12  Subjective:       Prior to Admission medications    Medication Sig Start Date End Date Taking? Authorizing Provider   cyclobenzaprine (FLEXERIL) 10 mg tablet Take 1 Tablet by mouth three (3) times daily as needed for Muscle Spasm(s). 6/7/22   Philippe Boxer, NP   lidocaine (Lidoderm) 5 % Apply patch to the affected area for 12 hours a day and remove for 12 hours a day. 6/7/22   Philippe Boxer, NP   methylPREDNISolone (Medrol, Jaylan,) 4 mg tablet Please follow instructions on dose pack. 6/8/22   Philippe Boxer, NP   naproxen (NAPROSYN) 375 mg tablet Take 1 Tablet by mouth two (2) times daily (with meals). 6/7/22   Philippe Boxer, NP   amLODIPine (NORVASC) 5 mg tablet TAKE 1 TABLET BY MOUTH ONCE DAILY FOR BLOOD PRESSURE 1/18/22   Provider, Historical   propranolol LA (INDERAL LA) 60 mg SR capsule Take 1 Capsule by mouth daily. 3/29/22   Shar Faye MD   acetaminophen (TYLENOL) 325 mg tablet Take 650 mg by mouth every six (6) hours as needed.   Patient not taking: Reported on 3/29/2022 5/20/21   Provider, Historical   ibuprofen (MOTRIN) 600 mg tablet Take 600 mg by mouth every six (6) hours as needed. Patient not taking: Reported on 3/29/2022 5/20/21   Provider, Historical   methocarbamoL (Robaxin) 500 mg tablet Take 1 Tablet by mouth three (3) times daily. Patient not taking: Reported on 3/29/2022 7/11/21   KAM Islas   zolpidem (Ambien) 10 mg tablet Take 10 mg by mouth nightly as needed for Sleep. Provider, Historical     Past Medical History:   Diagnosis Date    ADHD (attention deficit hyperactivity disorder)     Headache     Hypertension     Insomnia     Leukemia ([address]     Leukopenia     Leukopenia     Lumbar herniated disc     Pancreatitis     Psychiatric disorder          Patient-Reported Weight: 242       [place]  was evaluated through a patient-initiated, synchronous (real-time) audio only encounter. He (or guardian if applicable) is aware that it is a billable service, which includes applicable co-pays, with coverage as determined by his insurance carrier. This visit was conducted with the patient's (and/or Sol Field guardian's) verbal consent. He has not had a related appointment within my department in the past 7 days or scheduled within the next 24 hours. The patient was located in a state where the provider was licensed to provide care. The patient was located at: Home: [place] [phone]  The provider was located at:  Facility (Appt Department): [place]    Total Time: minutes: 21-30 minutes    Angela Galvan MD

## 2023-02-03 DIAGNOSIS — D70.8 BENIGN ETHNIC NEUTROPENIA (HCC): Primary | ICD-10-CM

## 2023-06-09 ENCOUNTER — APPOINTMENT (OUTPATIENT)
Dept: INFUSION THERAPY | Age: 33
End: 2023-06-09

## 2024-12-20 ENCOUNTER — OFFICE VISIT (OUTPATIENT)
Age: 34
End: 2024-12-20
Payer: COMMERCIAL

## 2024-12-20 VITALS
BODY MASS INDEX: 38.8 KG/M2 | WEIGHT: 256 LBS | HEIGHT: 68 IN | HEART RATE: 83 BPM | SYSTOLIC BLOOD PRESSURE: 116 MMHG | DIASTOLIC BLOOD PRESSURE: 69 MMHG | OXYGEN SATURATION: 97 %

## 2024-12-20 DIAGNOSIS — R06.02 SHORTNESS OF BREATH: ICD-10-CM

## 2024-12-20 DIAGNOSIS — E11.9 TYPE 2 DIABETES MELLITUS WITHOUT COMPLICATION, WITHOUT LONG-TERM CURRENT USE OF INSULIN (HCC): ICD-10-CM

## 2024-12-20 DIAGNOSIS — E66.01 SEVERE OBESITY (BMI 35.0-39.9) WITH COMORBIDITY: ICD-10-CM

## 2024-12-20 DIAGNOSIS — E78.2 MIXED HYPERLIPIDEMIA: ICD-10-CM

## 2024-12-20 DIAGNOSIS — I10 ESSENTIAL HYPERTENSION: ICD-10-CM

## 2024-12-20 DIAGNOSIS — R07.9 CHEST PAIN, UNSPECIFIED TYPE: Primary | ICD-10-CM

## 2024-12-20 DIAGNOSIS — R42 DIZZINESS: ICD-10-CM

## 2024-12-20 PROCEDURE — 3074F SYST BP LT 130 MM HG: CPT | Performed by: INTERNAL MEDICINE

## 2024-12-20 PROCEDURE — 93000 ELECTROCARDIOGRAM COMPLETE: CPT | Performed by: INTERNAL MEDICINE

## 2024-12-20 PROCEDURE — 3078F DIAST BP <80 MM HG: CPT | Performed by: INTERNAL MEDICINE

## 2024-12-20 PROCEDURE — 99204 OFFICE O/P NEW MOD 45 MIN: CPT | Performed by: INTERNAL MEDICINE

## 2024-12-20 RX ORDER — LOSARTAN POTASSIUM AND HYDROCHLOROTHIAZIDE 12.5; 5 MG/1; MG/1
1 TABLET ORAL DAILY
COMMUNITY
End: 2024-12-20 | Stop reason: SINTOL

## 2024-12-20 RX ORDER — ATORVASTATIN CALCIUM 10 MG/1
10 TABLET, FILM COATED ORAL DAILY
COMMUNITY

## 2024-12-20 RX ORDER — LOSARTAN POTASSIUM 25 MG/1
25 TABLET ORAL DAILY
COMMUNITY

## 2024-12-20 RX ORDER — SEMAGLUTIDE 1.34 MG/ML
1 INJECTION, SOLUTION SUBCUTANEOUS
COMMUNITY

## 2024-12-20 ASSESSMENT — PATIENT HEALTH QUESTIONNAIRE - PHQ9
SUM OF ALL RESPONSES TO PHQ QUESTIONS 1-9: 0
1. LITTLE INTEREST OR PLEASURE IN DOING THINGS: NOT AT ALL
2. FEELING DOWN, DEPRESSED OR HOPELESS: NOT AT ALL
DEPRESSION UNABLE TO ASSESS: FUNCTIONAL CAPACITY MOTIVATION LIMITS ACCURACY
SUM OF ALL RESPONSES TO PHQ QUESTIONS 1-9: 0
SUM OF ALL RESPONSES TO PHQ9 QUESTIONS 1 & 2: 0

## 2024-12-20 ASSESSMENT — ENCOUNTER SYMPTOMS
EYES NEGATIVE: 1
GASTROINTESTINAL NEGATIVE: 1
RESPIRATORY NEGATIVE: 1

## 2024-12-20 NOTE — PROGRESS NOTES
Trish Wilcox is a 34 y.o. year old male.    12/20/2024 seen as a new patient for chest pain.  He was in the ER when his blood pressure was severely elevated at 238 systolic.  He was ruled out for MI.  But could not stay for stress test.  He was started on antihypertensives as well as statins.  He had chest discomfort described as tightness which radiated to the left arm.  Gets dyspnea on exertion walking about 10 minutes.  Had edema when he was taking amlodipine which has resolved since medicine was switched.  Dizziness if he gets up too fast or laughs hard.  Has palpitations unrelated to exertion or food lasting few minutes and happening few times a week.          Review of Systems   Constitutional: Negative.    HENT: Negative.     Eyes: Negative.    Respiratory: Negative.     Cardiovascular: Negative.    Gastrointestinal: Negative.    Endocrine: Negative.    Genitourinary: Negative.    Musculoskeletal: Negative.    Neurological: Negative.    Psychiatric/Behavioral: Negative.     All other systems reviewed and are negative.        Physical Exam  Vitals and nursing note reviewed.   Constitutional:       Appearance: Normal appearance. He is obese.   HENT:      Head: Normocephalic and atraumatic.      Nose: Nose normal.   Eyes:      Conjunctiva/sclera: Conjunctivae normal.   Cardiovascular:      Rate and Rhythm: Normal rate and regular rhythm.      Pulses: Normal pulses.      Heart sounds: Normal heart sounds.   Pulmonary:      Effort: Pulmonary effort is normal.      Breath sounds: Normal breath sounds.   Abdominal:      General: Bowel sounds are normal.      Palpations: Abdomen is soft.   Musculoskeletal:         General: Normal range of motion.   Skin:     General: Skin is warm and dry.   Neurological:      General: No focal deficit present.      Mental Status: He is alert and oriented to person, place, and time.   Psychiatric:         Mood and Affect: Mood normal.         Behavior: Behavior normal.        No

## 2025-02-03 ENCOUNTER — HOSPITAL ENCOUNTER (EMERGENCY)
Facility: HOSPITAL | Age: 35
Discharge: HOME | End: 2025-02-03
Attending: EMERGENCY MEDICINE
Payer: COMMERCIAL

## 2025-02-03 VITALS
HEART RATE: 102 BPM | TEMPERATURE: 99.1 F | SYSTOLIC BLOOD PRESSURE: 127 MMHG | BODY MASS INDEX: 37.74 KG/M2 | HEIGHT: 68 IN | OXYGEN SATURATION: 98 % | RESPIRATION RATE: 18 BRPM | WEIGHT: 249 LBS | DIASTOLIC BLOOD PRESSURE: 76 MMHG

## 2025-02-03 DIAGNOSIS — M79.10 MYALGIA: ICD-10-CM

## 2025-02-03 DIAGNOSIS — J10.1 INFLUENZA A: Primary | ICD-10-CM

## 2025-02-03 LAB
FLUAV AG NPH QL IA: POSITIVE
FLUBV AG NOSE QL IA: NEGATIVE
SARS-COV-2 RDRP RESP QL NAA+PROBE: NOT DETECTED
SOURCE: NORMAL

## 2025-02-03 PROCEDURE — 99283 EMERGENCY DEPT VISIT LOW MDM: CPT

## 2025-02-03 PROCEDURE — 87804 INFLUENZA ASSAY W/OPTIC: CPT

## 2025-02-03 PROCEDURE — 6370000000 HC RX 637 (ALT 250 FOR IP): Performed by: EMERGENCY MEDICINE

## 2025-02-03 PROCEDURE — 87635 SARS-COV-2 COVID-19 AMP PRB: CPT

## 2025-02-03 RX ORDER — ACETAMINOPHEN 325 MG/1
650 TABLET ORAL
Status: COMPLETED | OUTPATIENT
Start: 2025-02-03 | End: 2025-02-03

## 2025-02-03 RX ORDER — OSELTAMIVIR PHOSPHATE 75 MG/1
75 CAPSULE ORAL 2 TIMES DAILY
Qty: 10 CAPSULE | Refills: 0 | Status: SHIPPED | OUTPATIENT
Start: 2025-02-03 | End: 2025-02-08

## 2025-02-03 RX ORDER — IBUPROFEN 600 MG/1
600 TABLET, FILM COATED ORAL
Status: COMPLETED | OUTPATIENT
Start: 2025-02-03 | End: 2025-02-03

## 2025-02-03 RX ORDER — OSELTAMIVIR PHOSPHATE 75 MG/1
75 CAPSULE ORAL
Status: COMPLETED | OUTPATIENT
Start: 2025-02-03 | End: 2025-02-03

## 2025-02-03 RX ADMIN — ACETAMINOPHEN 650 MG: 325 TABLET ORAL at 23:31

## 2025-02-03 RX ADMIN — OSELTAMIVIR PHOSPHATE 75 MG: 75 CAPSULE ORAL at 23:31

## 2025-02-03 RX ADMIN — IBUPROFEN 600 MG: 600 TABLET, FILM COATED ORAL at 23:31

## 2025-02-03 ASSESSMENT — PAIN - FUNCTIONAL ASSESSMENT
PAIN_FUNCTIONAL_ASSESSMENT: 0-10
PAIN_FUNCTIONAL_ASSESSMENT: ACTIVITIES ARE NOT PREVENTED
PAIN_FUNCTIONAL_ASSESSMENT: ACTIVITIES ARE NOT PREVENTED

## 2025-02-03 ASSESSMENT — PAIN SCALES - GENERAL
PAINLEVEL_OUTOF10: 10
PAINLEVEL_OUTOF10: 9

## 2025-02-03 ASSESSMENT — PAIN DESCRIPTION - DESCRIPTORS
DESCRIPTORS: ACHING
DESCRIPTORS: ACHING

## 2025-02-03 ASSESSMENT — PAIN DESCRIPTION - ORIENTATION
ORIENTATION: OTHER (COMMENT)
ORIENTATION: OTHER (COMMENT)

## 2025-02-03 ASSESSMENT — PAIN DESCRIPTION - PAIN TYPE: TYPE: ACUTE PAIN

## 2025-02-03 ASSESSMENT — PAIN DESCRIPTION - FREQUENCY: FREQUENCY: CONTINUOUS

## 2025-02-03 ASSESSMENT — PAIN DESCRIPTION - LOCATION
LOCATION: GENERALIZED
LOCATION: GENERALIZED

## 2025-02-03 ASSESSMENT — PAIN DESCRIPTION - ONSET: ONSET: PROGRESSIVE

## 2025-02-04 NOTE — ED PROVIDER NOTES
[facility] EMERGENCY DEPARTMENT  EMERGENCY DEPARTMENT ENCOUNTER      Pt Name: Trish Wilcox  MRN: [id number]  Birthdate 1990  Date of evaluation: 2/3/2025  Provider: Braxton Tuttle MD    CHIEF COMPLAINT       Chief Complaint   Patient presents with    Fever     Pt to ED reports fever, cough, nausea, body aches onset Friday        HPI:  Trish Wilcox is a 34 y.o. male who presents to the emergency department pt c/o cough/body aches, fever x 3 days.  H/o neutropenia per pt.   No diarrhea.       HPI    Nursing Notes were reviewed.    REVIEW OF SYSTEMS    (2-9 systems for level 4, 10 or more for level 5)     Review of Systems    Except as noted above the remainder of the review of systems was reviewed and negative.       PAST MEDICAL HISTORY     Past Medical History:   Diagnosis Date    ADHD (attention deficit hyperactivity disorder)     Diabetes mellitus (HCC)     Fatty liver     Headache     Hyperlipidemia     Hypertension     Insomnia     Leukopenia     Leukopenia     Lumbar herniated disc     Pancreatitis     Psychiatric disorder     Right leg DVT (HCC) 2018         SURGICAL HISTORY     No past surgical history on file.      CURRENT MEDICATIONS       Previous Medications    ACETAMINOPHEN (TYLENOL) 325 MG TABLET    Take 2 tablets by mouth every 6 hours as needed    ASCORBIC ACID (VITAMIN C) 500 MG CHEW    Take by mouth daily    ATORVASTATIN (LIPITOR) 10 MG TABLET    Take 1 tablet by mouth daily    ELDERBERRY-VITAMIN C-ZINC (ELDERBERRY IMMUNE HEALTH GUMMY PO)    Take by mouth daily    FENOFIBRATE (TRICOR) 48 MG TABLET    Take 48 mg by mouth daily    LIDOCAINE (LIDODERM) 5 %    Apply patch to the affected area for 12 hours a day and remove for 12 hours a day.    LOSARTAN (COZAAR) 25 MG TABLET    Take 1 tablet by mouth daily    SEMAGLUTIDE, 1 MG/DOSE, (OZEMPIC, 1 MG/DOSE,) 4 MG/3ML SOPN SC INJECTION    Inject 1 mg into the skin every 7 days    ZOLPIDEM (AMBIEN) 10 MG TABLET    Take 1 tablet by